# Patient Record
Sex: FEMALE | Race: BLACK OR AFRICAN AMERICAN
[De-identification: names, ages, dates, MRNs, and addresses within clinical notes are randomized per-mention and may not be internally consistent; named-entity substitution may affect disease eponyms.]

---

## 2017-01-24 ENCOUNTER — HOSPITAL ENCOUNTER (EMERGENCY)
Dept: HOSPITAL 72 - EMR | Age: 42
Discharge: HOME | End: 2017-01-24
Payer: COMMERCIAL

## 2017-01-24 VITALS — DIASTOLIC BLOOD PRESSURE: 5 MMHG | SYSTOLIC BLOOD PRESSURE: 154 MMHG

## 2017-01-24 VITALS — HEIGHT: 65 IN | WEIGHT: 165 LBS | BODY MASS INDEX: 27.49 KG/M2

## 2017-01-24 VITALS — SYSTOLIC BLOOD PRESSURE: 142 MMHG | DIASTOLIC BLOOD PRESSURE: 65 MMHG

## 2017-01-24 DIAGNOSIS — I10: ICD-10-CM

## 2017-01-24 DIAGNOSIS — E78.00: ICD-10-CM

## 2017-01-24 DIAGNOSIS — K52.9: Primary | ICD-10-CM

## 2017-01-24 LAB
ALBUMIN/GLOB SERPL: 1.2 {RATIO} (ref 1–2.7)
ALT SERPL-CCNC: 14 U/L (ref 3–33)
ANION GAP SERPL CALC-SCNC: 11 MMOL/L (ref 5–15)
APPEARANCE UR: CLEAR
AST SERPL-CCNC: 15 U/L (ref 5–40)
BACTERIA #/AREA URNS HPF: (no result) /HPF
BASOPHILS NFR BLD AUTO: 1.7 % (ref 0–2)
CALCIUM SERPL-MCNC: 8.6 MG/DL (ref 8.6–10.2)
CHLORIDE SERPL-SCNC: 99 MEQ/L (ref 98–107)
CO2 SERPL-SCNC: 27 MEQ/L (ref 20–30)
CREAT SERPL-MCNC: 0.9 MG/DL (ref 0.5–0.9)
EOSINOPHIL NFR BLD AUTO: 0.2 % (ref 0–3)
ERYTHROCYTE [DISTWIDTH] IN BLOOD BY AUTOMATED COUNT: 12.5 % (ref 11.6–14.8)
GFR SERPLBLD BASED ON 1.73 SQ M-ARVRAT: > 60 ML/MIN (ref 60–?)
GLOBULIN SER-MCNC: 3.2 G/DL
HEMOLYSIS: 8
KETONES UR QL STRIP: (no result)
LEUKOCYTE ESTERASE UR QL STRIP: (no result)
LIPASE SERPL-CCNC: 14 U/L (ref ?–60)
LYMPHOCYTES NFR BLD AUTO: 13.3 % (ref 20–45)
MCH RBC QN AUTO: 31.7 PG (ref 27–31)
MCHC RBC AUTO-ENTMCNC: 33.6 G/DL (ref 32–36)
MCV RBC AUTO: 95 FL (ref 80–99)
MONOCYTES NFR BLD AUTO: 8 % (ref 1–10)
NEUTROPHILS NFR BLD AUTO: 76.9 % (ref 45–75)
NITRITE UR QL STRIP: NEGATIVE
PH UR STRIP: 5 [PH] (ref 4.5–8)
PLATELET # BLD: 266 K/UL (ref 150–450)
PMV BLD AUTO: 7.5 FL (ref 6.5–10.1)
POTASSIUM SERPL-SCNC: 3.9 MEQ/L (ref 3.4–4.9)
PROT SERPL-MCNC: 7.2 G/DL (ref 6.6–8.7)
PROT UR QL STRIP: (no result)
RBC # BLD AUTO: 4.6 M/UL (ref 4.2–5.4)
RBC #/AREA URNS HPF: (no result) /HPF (ref 0–2)
SODIUM SERPL-SCNC: 137 MEQ/L (ref 135–145)
SP GR UR STRIP: 1.02 (ref 1–1.03)
SQUAMOUS #/AREA URNS LPF: (no result) /LPF
UROBILINOGEN UR-MCNC: 1 MG/DL (ref 0–1)
WBC # BLD AUTO: 6.2 K/UL (ref 4.8–10.8)
WBC #/AREA URNS HPF: (no result) /HPF (ref 0–2)

## 2017-01-24 PROCEDURE — 81025 URINE PREGNANCY TEST: CPT

## 2017-01-24 PROCEDURE — 85025 COMPLETE CBC W/AUTO DIFF WBC: CPT

## 2017-01-24 PROCEDURE — 99284 EMERGENCY DEPT VISIT MOD MDM: CPT

## 2017-01-24 PROCEDURE — 36415 COLL VENOUS BLD VENIPUNCTURE: CPT

## 2017-01-24 PROCEDURE — 96375 TX/PRO/DX INJ NEW DRUG ADDON: CPT

## 2017-01-24 PROCEDURE — 80053 COMPREHEN METABOLIC PANEL: CPT

## 2017-01-24 PROCEDURE — 83690 ASSAY OF LIPASE: CPT

## 2017-01-24 PROCEDURE — 96374 THER/PROPH/DIAG INJ IV PUSH: CPT

## 2017-01-24 PROCEDURE — 76700 US EXAM ABDOM COMPLETE: CPT

## 2017-01-24 PROCEDURE — 81003 URINALYSIS AUTO W/O SCOPE: CPT

## 2017-01-25 NOTE — DIAGNOSTIC IMAGING REPORT
Indication: Right upper quadrant and right flank pain



Technique: Gray-scale and duplex images of the upper abdomen were obtained



Comparison: None



Findings:    . Gallbladder is unremarkable, without stones, wall thickening, 

nor

pericholecystic fluid.  Sonographic Mathews's sign is negative. Common bile duct

measures 6 mm in diameter.  No intrahepatic biliary ductal dilatation.  

Liver

demonstrates normal echogenicity. Within segment 4a there is a 17 mm hyperechoic

mass with equivocal distal acoustic enhancement.  Portal vein and hepatic veins 

are

patent.  Pancreas is unremarkable.  Spleen is unremarkable. Left kidney measures

10.1 cm in length.  Right kidney measures 10.9 cm length.  Both kidneys 

demonstrate

normal echogenicity. There is no hydronephrosis.  No focal abnormality.

Non-aneurysmal abdominal aorta.



Impression: Negative for gallstones or dilated ducts



Probable left hepatic lobe hemangioma. Recommend further evaluation with MRI or CT

with hemangioma protocol

## 2017-01-25 NOTE — EMERGENCY ROOM REPORT
History of Present Illness


General


Chief Complaint:  Nausea, Vomiting, and Diarrhea


Source:  Patient





Present Illness


HPI


The pt is a 41 yo F with a hx of HTN presenting for abd pain, nausea, and 

vomiting which began after eating something questionable. The pt states the 

pain is a 9/10 dull ache to the mid upper and right upper region. Pain mostly 

presents with bouts of diarrhea/vomiting. The pt developed these symptoms 3 

days prior and frequency of N/V/D has been decreasing. The pt denies any other 

symptoms such as F, chills, HA, dysuria, hematuria, vaginal DC, SOB, CP


Allergies:  


Coded Allergies:  


     NO KNOWN ALLERGIES (Unverified  Allergy, Unknown, 9/7/15)





Patient History


Past Medical History:  see triage record


Pertinent Family History:  none


Last Menstrual Period:  1-17


Pregnant Now:  No


Reviewed Nursing Documentation:  PMH: Agreed, PSxH: Agreed





Nursing Documentation-PMH


Past Medical History:  No History, Except For


Hx Hypertension:  Yes - high cholesterol


Hx Neurological Problems:  Yes - migraine headache





Review of Systems


All Other Systems:  negative except mentioned in HPI





Physical Exam





Vital Signs








  Date Time  Temp Pulse Resp B/P Pulse Ox O2 Delivery O2 Flow Rate FiO2


 


1/24/17 14:29 98.1 77 18 154/104 98 Room Air  








Sp02 EP Interpretation:  reviewed, normal


General Appearance:  no apparent distress, alert, GCS 15, non-toxic


Head:  normocephalic, atraumatic


Eyes:  bilateral eye PERRL, bilateral eye normal inspection


ENT:  hearing grossly normal, normal pharynx, no angioedema, normal voice


Neck:  full range of motion, supple/symm/no masses


Respiratory:  chest non-tender, lungs clear, normal breath sounds, speaking 

full sentences


Cardiovascular #1:  regular rate, rhythm, no edema


Gastrointestinal:  soft, no mass, non-distended, no guarding, tenderness - 

epigastric and RUQ


Genitourinary:  normal inspection, no CVA tenderness


Musculoskeletal:  back normal, gait/station normal, normal range of motion, non-

tender, calf tenderness


Neurologic:  alert, oriented x3, responsive, motor strength/tone normal, 

sensory intact, speech normal


Psychiatric:  judgement/insight normal, memory normal, mood/affect normal, no 

suicidal/homicidal ideation


Skin:  normal color, no rash, warm/dry, well hydrated


Lymphatic:  no adenopathy





Medical Decision Making


PA Attestation


Dr. Barragan is my supervising physician. Patient management was discussed 

with my supervising physician


Diagnostic Impression:  


 Primary Impression:  


 Gastroenteritis


ER Course


The pt is a 41 yo F with a hx of HTN presenting for abd pain, nausea, and 

vomiting 





DDx: pregnancy, ectopic preg, cholelithiasis, cholecystitis, gastroenteritis, 

UTI 





PE: afebrile. NAD


Abd: soft, no guarding. Normal BS. + RUQ and epigastric TTP. 





ABd US:


Negative for gallstones or dilated ducts





Labs:


CBC and CMP unremarkable. No leukocytosis. 


UA not consistent with UTI.


Neg preg





The pt was advised that this US reading is preliminary and will call tomorrow 

for official reading. 





Pt DC'ed home with prescription for zofran and tylenol and will follow BRAT 

diet. ER precautions given





Labs








Test


  1/24/17


14:35 1/24/17


14:45


 


Urine Color Yellow  


 


Urine Appearance Clear  


 


Urine pH 5 (4.5-8.0)  


 


Urine Specific Gravity


  1.025


(1.005-1.035) 


 


 


Urine Protein 2+ (NEGATIVE)  


 


Urine Glucose (UA)


  Negative


(NEGATIVE) 


 


 


Urine Ketones 1+ (NEGATIVE)  


 


Urine Occult Blood 5+ (NEGATIVE)  


 


Urine Nitrite


  Negative


(NEGATIVE) 


 


 


Urine Bilirubin


  Negative


(NEGATIVE) 


 


 


Urine Urobilinogen


  1 MG/DL


(0.0-1.0) 


 


 


Urine Leukocyte Esterase 1+ (NEGATIVE)  


 


Urine RBC


  2-4 /HPF (0 -


2) 


 


 


Urine WBC


  0-2 /HPF (0 -


2) 


 


 


Urine Squamous Epithelial


Cells Few /LPF


(NONE/OCC) 


 


 


Urine Bacteria


  Few /HPF


(NONE) 


 


 


Urine HCG, Qualitative Negative  


 


White Blood Count


  


  6.2 K/UL


(4.8-10.8)


 


Red Blood Count


  


  4.60 M/UL


(4.20-5.40)


 


Hemoglobin


  


  14.6 G/DL


(12.0-16.0)


 


Hematocrit


  


  43.5 %


(37.0-47.0)


 


Mean Corpuscular Volume  95 FL (80-99) 


 


Mean Corpuscular Hemoglobin


  


  31.7 PG


(27.0-31.0)


 


Mean Corpuscular Hemoglobin


Concent 


  33.6 G/DL


(32.0-36.0)


 


Red Cell Distribution Width


  


  12.5 %


(11.6-14.8)


 


Platelet Count


  


  266 K/UL


(150-450)


 


Mean Platelet Volume


  


  7.5 FL


(6.5-10.1)


 


Neutrophils (%) (Auto)


  


  76.9 %


(45.0-75.0)


 


Lymphocytes (%) (Auto)


  


  13.3 %


(20.0-45.0)


 


Monocytes (%) (Auto)


  


  8.0 %


(1.0-10.0)


 


Eosinophils (%) (Auto)


  


  0.2 %


(0.0-3.0)


 


Basophils (%) (Auto)


  


  1.7 %


(0.0-2.0)


 


Sodium Level


  


  137 mEQ/L


(135-145)


 


Potassium Level


  


  3.9 mEQ/L


(3.4-4.9)


 


Chloride Level


  


  99 mEQ/L


()


 


Carbon Dioxide Level


  


  27 mEQ/L


(20-30)


 


Anion Gap  11 (5-15) 


 


Blood Urea Nitrogen


  


  11 mg/dL


(7-23)


 


Creatinine


  


  0.9 mg/dL


(0.5-0.9)


 


Estimat Glomerular Filtration


Rate 


  > 60 mL/min


(>60)


 


Glucose Level


  


  105 mg/dL


()


 


Calcium Level


  


  8.6 mg/dL


(8.6-10.2)


 


Total Bilirubin


  


  < 0.2 mg/dL


(0.0-1.2)


 


Aspartate Amino Transf


(AST/SGOT) 


  15 U/L (5-40) 


 


 


Alanine Aminotransferase


(ALT/SGPT) 


  14 U/L (3-33) 


 


 


Alkaline Phosphatase


  


  65 U/L


()


 


Total Protein


  


  7.2 g/dL


(6.6-8.7)


 


Albumin


  


  4.0 g/dL


(3.5-5.2)


 


Globulin  3.2 g/dL 


 


Albumin/Globulin Ratio  1.2 (1.0-2.7) 


 


Lipase  14 U/L (< 60) 








Lab Results Impression


CBC and CMP unremarkable. No leukocytosis. 


UA not consistent with UTI.


Neg preg





Last Vital Signs








  Date Time  Temp Pulse Resp B/P Pulse Ox O2 Delivery O2 Flow Rate FiO2


 


1/24/17 18:23 98.0 85 18 142/65 98 Room Air  








Status:  improved


Disposition:  HOME, SELF-CARE


Condition:  Improved


Scripts


Ondansetron* (ZOFRAN*) 4 Mg Tablet


4 MG ORAL Q6H Y for Nausea & Vomiting, #20 TAB


   Prov: EVANGELISTA MANRIQUE P.A.         1/24/17 


Acetaminophen* (TYLENOL EXTRA STRENGTH*) 500 Mg Tablet


500 MG ORAL Q8H Y for Prn Headache/Temp > 101, #30 TAB 0 Refills


   Prov: EVANGELISTA MANRIQUE         1/24/17


Departure Forms:  Return to Work      Return to Work Date:  Jan 25, 2017


         Return to Full Activity:  Jan 25, 2017


Patient Instructions:  Viral Gastroenteritis, Adult, Abdominal Pain, Adult





Additional Instructions:  


I discussed my findings with the patient. All questions and concerns have been 

answered. Treatment and medication compliance have been addressed. I advised 

the patient that they need to follow up with PMD in 3-5 days. Return to ED if 

symptoms worsen, new symptoms arise, or if needed for any reason. Patient 

verbalized understanding of discharge instructions.











EVANGELISTA MANRIQUE Jan 25, 2017 17:46

## 2017-06-14 ENCOUNTER — HOSPITAL ENCOUNTER (EMERGENCY)
Dept: HOSPITAL 72 - EMR | Age: 42
Discharge: HOME | End: 2017-06-14
Payer: COMMERCIAL

## 2017-06-14 VITALS — SYSTOLIC BLOOD PRESSURE: 140 MMHG | DIASTOLIC BLOOD PRESSURE: 96 MMHG

## 2017-06-14 VITALS — WEIGHT: 170 LBS | HEIGHT: 64 IN | BODY MASS INDEX: 29.02 KG/M2

## 2017-06-14 VITALS — DIASTOLIC BLOOD PRESSURE: 87 MMHG | SYSTOLIC BLOOD PRESSURE: 135 MMHG

## 2017-06-14 DIAGNOSIS — D25.9: ICD-10-CM

## 2017-06-14 DIAGNOSIS — Z3A.00: ICD-10-CM

## 2017-06-14 DIAGNOSIS — I10: ICD-10-CM

## 2017-06-14 DIAGNOSIS — O20.0: Primary | ICD-10-CM

## 2017-06-14 DIAGNOSIS — N83.202: ICD-10-CM

## 2017-06-14 LAB
ALBUMIN/GLOB SERPL: 1.2 {RATIO} (ref 1–2.7)
ALT SERPL-CCNC: 11 U/L (ref 3–33)
ANION GAP SERPL CALC-SCNC: 12 MMOL/L (ref 5–15)
APPEARANCE UR: CLEAR
AST SERPL-CCNC: 13 U/L (ref 5–40)
BACTERIA #/AREA URNS HPF: (no result) /HPF
BASOPHILS NFR BLD AUTO: 1.3 % (ref 0–2)
CALCIUM SERPL-MCNC: 8.7 MG/DL (ref 8.6–10.2)
CHLORIDE SERPL-SCNC: 101 MEQ/L (ref 98–107)
CO2 SERPL-SCNC: 24 MEQ/L (ref 20–30)
CREAT SERPL-MCNC: 0.9 MG/DL (ref 0.5–0.9)
EOSINOPHIL NFR BLD AUTO: 6.2 % (ref 0–3)
ERYTHROCYTE [DISTWIDTH] IN BLOOD BY AUTOMATED COUNT: 12.1 % (ref 11.6–14.8)
GFR SERPLBLD BASED ON 1.73 SQ M-ARVRAT: > 60 ML/MIN (ref 60–?)
GLOBULIN SER-MCNC: 3 G/DL
HEMOLYSIS: 0
KETONES UR QL STRIP: NEGATIVE
LEUKOCYTE ESTERASE UR QL STRIP: NEGATIVE
LIPASE SERPL-CCNC: 28 U/L (ref ?–60)
LYMPHOCYTES NFR BLD AUTO: 26.6 % (ref 20–45)
MCH RBC QN AUTO: 30.5 PG (ref 27–31)
MCHC RBC AUTO-ENTMCNC: 33.1 G/DL (ref 32–36)
MCV RBC AUTO: 92 FL (ref 80–99)
MONOCYTES NFR BLD AUTO: 7 % (ref 1–10)
NEUTROPHILS NFR BLD AUTO: 58.9 % (ref 45–75)
NITRITE UR QL STRIP: NEGATIVE
PH UR STRIP: 6 [PH] (ref 4.5–8)
PLATELET # BLD: 297 K/UL (ref 150–450)
PMV BLD AUTO: 7.1 FL (ref 6.5–10.1)
POTASSIUM SERPL-SCNC: 4.2 MEQ/L (ref 3.4–4.9)
PROT SERPL-MCNC: 6.6 G/DL (ref 6.6–8.7)
PROT UR QL STRIP: NEGATIVE
RBC # BLD AUTO: 4.49 M/UL (ref 4.2–5.4)
RBC #/AREA URNS HPF: (no result) /HPF (ref 0–2)
SODIUM SERPL-SCNC: 137 MEQ/L (ref 135–145)
SP GR UR STRIP: 1.02 (ref 1–1.03)
SQUAMOUS #/AREA URNS LPF: (no result) /LPF
TROPONIN I SERPL-MCNC: < 0.3 NG/ML (ref ?–0.3)
UROBILINOGEN UR-MCNC: NORMAL MG/DL (ref 0–1)
WBC # BLD AUTO: 6.1 K/UL (ref 4.8–10.8)
WBC #/AREA URNS HPF: (no result) /HPF (ref 0–2)

## 2017-06-14 PROCEDURE — 99284 EMERGENCY DEPT VISIT MOD MDM: CPT

## 2017-06-14 PROCEDURE — 81003 URINALYSIS AUTO W/O SCOPE: CPT

## 2017-06-14 PROCEDURE — 84484 ASSAY OF TROPONIN QUANT: CPT

## 2017-06-14 PROCEDURE — 84702 CHORIONIC GONADOTROPIN TEST: CPT

## 2017-06-14 PROCEDURE — 96374 THER/PROPH/DIAG INJ IV PUSH: CPT

## 2017-06-14 PROCEDURE — 96375 TX/PRO/DX INJ NEW DRUG ADDON: CPT

## 2017-06-14 PROCEDURE — 76801 OB US < 14 WKS SINGLE FETUS: CPT

## 2017-06-14 PROCEDURE — 83690 ASSAY OF LIPASE: CPT

## 2017-06-14 PROCEDURE — 36415 COLL VENOUS BLD VENIPUNCTURE: CPT

## 2017-06-14 PROCEDURE — 85025 COMPLETE CBC W/AUTO DIFF WBC: CPT

## 2017-06-14 PROCEDURE — 81025 URINE PREGNANCY TEST: CPT

## 2017-06-14 PROCEDURE — 76830 TRANSVAGINAL US NON-OB: CPT

## 2017-06-14 PROCEDURE — 80053 COMPREHEN METABOLIC PANEL: CPT

## 2017-06-14 NOTE — DIAGNOSTIC IMAGING REPORT
Indication: Pelvic pain, positive pregnancy test



Technique: Transabdominal and transvaginal images



Comparison: None



Findings: Uterus measures 13.7 cm length by 6 cm AP. Endometrium measures 13

millimeters thick. Small 8mm ill-defined fluid collection is seen within the

endometrium, but this does not demonstrate a definite decidual reaction; no 

definite

gestational sac is demonstrated. There are multiple uterine fibroids, which measure

up to 4.4 cm in diameter. The left ovary demonstrates a 5.6 cm cyst with 

internal

septations, thin wall and no significant peripheral hypervascularity, suggestive of

a hemorrhagic cyst. The right ovary measures 4.5 cm in length. There is free

cul-de-sac fluid.



Impression: No definite intrauterine pregnancy. A small fluid collection within 

the

endometrium is nonspecific. This could conceivably represent a small gestational

sac, but this does not correlate to the beta-hCG level, which is reportedly over

5000 per discussion with referring physician. Therefore, the possibility of 

ectopic

pregnancy should definitely be considered. Findings also represent spontaneous

. Correlation with serial beta hCGs is recommended



5.6 cm cyst in the left ovary with internal septations. Most likely a hemorrhagic

corpus luteum. Ectopic pregnancy as etiology of this finding less likely.



Trace free cul-de-sac fluid



Findings discussed by phone with Dr. Wiseman in the emergency room at the time 

of

interpretation

## 2017-06-14 NOTE — EMERGENCY ROOM REPORT
History of Present Illness


General


Chief Complaint:  Vaginal


Source:  Patient





Present Illness


HPI


42-year-old female presents ED complaining of lower abdominal pain x2 weeks.  

Notes pain in suprapubic area radiating through the epigastric region, 10 out 

of 10, sharp.  Denies nausea or vomiting.  Denies fevers or chills.  Denies 

discharge.  Denies dysuria or hematuria.  Denies chest pain shortness of 

breath.  No other aggravating relieving factors.  Denies any other associated 

symptoms


Allergies:  


Coded Allergies:  


     NO KNOWN ALLERGIES (Unverified  Allergy, Unknown, 9/7/15)





Patient History


Past Medical History:  migraines


Past Surgical History:  none


Pertinent Family History:  none


Social History:  Denies: alcohol use, drug use, smoking


Last Menstrual Period:  last month


Pregnant Now:  No


Immunizations:  UTD


Reviewed Nursing Documentation:  PMH: Agreed, PSxH: Agreed





Nursing Documentation-PMH


Past Medical History:  No History, Except For


Hx Hypertension:  Yes - high cholesterol


Hx Neurological Problems:  Yes - migraine headache





Review of Systems


All Other Systems:  negative except mentioned in HPI





Physical Exam





Vital Signs








  Date Time  Temp Pulse Resp B/P Pulse Ox O2 Delivery O2 Flow Rate FiO2


 


17 09:19 99.0 70 18 140/96 98 Room Air  








Sp02 EP Interpretation:  reviewed, normal


General Appearance:  no apparent distress, alert, GCS 15, non-toxic


Head:  normocephalic


Eyes:  bilateral eye PERRL, bilateral eye normal inspection


ENT:  normal ENT inspection


Neck:  normal inspection


Respiratory:  chest non-tender, lungs clear, normal breath sounds, speaking 

full sentences


Cardiovascular #1:  regular rate, rhythm, no edema


Gastrointestinal:  normal bowel sounds, soft, non-distended, no guarding, no 

rebound, tenderness


Rectal:  deferred


Genitourinary:  no CVA tenderness


Musculoskeletal:  normal inspection


Neurologic:  alert, oriented x3, responsive, motor strength/tone normal, 

sensory intact, speech normal


Psychiatric:  normal inspection


Skin:  normal inspection


Lymphatic:  normal inspection





Medical Decision Making


Diagnostic Impression:  


 Primary Impression:  


 Threatened 


ER Course


Hospital Course 


42-year-old F presents to ED complaining of abdominal pain





Differential diagnoses include: gastrits, gastroenterits, UTI





Clinical course


Patient placed on stretcher in ED.  After initial history and physical I 

ordered labs, IV fluids





Labs-no leukocytosis, electrolytes okay, BHCG +





patient was not aware she was pregnant.  BHCG quantitatve sent





BHCG 5100





Pelvic ultrasound- no IUP detected, fibroids, L hemorrhagic cyst noted. ? 

gestational sac





Discussed findings with the patient.  Unlikely ectopic pregnancy.  However I 

recommend close followup with OB/GYN





Diagnosis - threatend 





Stable and discharged to home.  Followup with PMD/OB/GYN.  Return to ED if 

symptoms recur or worsen





Labs








Test


  17


09:45


 


White Blood Count


  6.1 K/UL


(4.8-10.8)


 


Red Blood Count


  4.49 M/UL


(4.20-5.40)


 


Hemoglobin


  13.7 G/DL


(12.0-16.0)


 


Hematocrit


  41.5 %


(37.0-47.0)


 


Mean Corpuscular Volume 92 FL (80-99) 


 


Mean Corpuscular Hemoglobin


  30.5 PG


(27.0-31.0)


 


Mean Corpuscular Hemoglobin


Concent 33.1 G/DL


(32.0-36.0)


 


Red Cell Distribution Width


  12.1 %


(11.6-14.8)


 


Platelet Count


  297 K/UL


(150-450)


 


Mean Platelet Volume


  7.1 FL


(6.5-10.1)


 


Neutrophils (%) (Auto)


  58.9 %


(45.0-75.0)


 


Lymphocytes (%) (Auto)


  26.6 %


(20.0-45.0)


 


Monocytes (%) (Auto)


  7.0 %


(1.0-10.0)


 


Eosinophils (%) (Auto)


  6.2 %


(0.0-3.0)


 


Basophils (%) (Auto)


  1.3 %


(0.0-2.0)


 


Urine Color Pale yellow 


 


Urine Appearance Clear 


 


Urine pH 6 (4.5-8.0) 


 


Urine Specific Gravity


  1.020


(1.005-1.035)


 


Urine Protein


  Negative


(NEGATIVE)


 


Urine Glucose (UA)


  Negative


(NEGATIVE)


 


Urine Ketones


  Negative


(NEGATIVE)


 


Urine Occult Blood 2+ (NEGATIVE) 


 


Urine Nitrite


  Negative


(NEGATIVE)


 


Urine Bilirubin


  Negative


(NEGATIVE)


 


Urine Urobilinogen


  Normal MG/DL


(0.0-1.0)


 


Urine Leukocyte Esterase


  Negative


(NEGATIVE)


 


Urine RBC


  2-4 /HPF (0 -


2)


 


Urine WBC


  0-2 /HPF (0 -


2)


 


Urine Squamous Epithelial


Cells Moderate /LPF


(NONE/OCC)


 


Urine Bacteria


  Few /HPF


(NONE)


 


Urine HCG, Qualitative Positive 


 


Sodium Level


  137 mEQ/L


(135-145)


 


Potassium Level


  4.2 mEQ/L


(3.4-4.9)


 


Chloride Level


  101 mEQ/L


()


 


Carbon Dioxide Level


  24 mEQ/L


(20-30)


 


Anion Gap 12 (5-15) 


 


Blood Urea Nitrogen


  11 mg/dL


(7-23)


 


Creatinine


  0.9 mg/dL


(0.5-0.9)


 


Estimat Glomerular Filtration


Rate > 60 mL/min


(>60)


 


Glucose Level


  95 mg/dL


()


 


Calcium Level


  8.7 mg/dL


(8.6-10.2)


 


Total Bilirubin


  < 0.2 mg/dL


(0.0-1.2)


 


Aspartate Amino Transf


(AST/SGOT) 13 U/L (5-40) 


 


 


Alanine Aminotransferase


(ALT/SGPT) 11 U/L (3-33) 


 


 


Alkaline Phosphatase


  72 U/L


()


 


Troponin I


  < 0.30 ng/mL


(<=0.30)


 


Total Protein


  6.6 g/dL


(6.6-8.7)


 


Albumin


  3.6 g/dL


(3.5-5.2)


 


Globulin 3.0 g/dL 


 


Albumin/Globulin Ratio 1.2 (1.0-2.7) 


 


Lipase 28 U/L (< 60) 


 


Human Chorionic Gonadotropin,


Quant 5100 mIU/mL 


 








CT/MRI/US Diagnostic Results


CT/MRI/US Diagnostic Results :  


   Imaging Test Ordered:  OB US


   Impression


no definite IUP noted. fibroids noted. L hemorrhagic cyst noted





Last Vital Signs








  Date Time  Temp Pulse Resp B/P Pulse Ox O2 Delivery O2 Flow Rate FiO2


 


17 09:58 99.0 81 18 140/96 98 Room Air  








Status:  improved


Disposition:  HOME, SELF-CARE


Condition:  Stable


Scripts


Ondansetron Odt* (ZOFRAN ODT*) 4 Mg Tab.rapdis


4 MG ORAL Q6H Y for Nausea & Vomiting, #30 TAB 0 Refills


   Prov: WILLIAMS BOYKIN M.D.         17 


Acetaminophen* (TYLENOL EXTRA STRENGTH*) 500 Mg Tablet


500 MG ORAL Q8H Y for Prn Headache/Temp > 101, #30 TAB 0 Refills


   Prov: WILLIAMS BOYKIN M.D.         17


Referrals:  


PREFERRED [facility],REFERRING (PCP)











WILLIAMS BOYKIN M.D. 2017 10:32

## 2017-06-17 ENCOUNTER — HOSPITAL ENCOUNTER (EMERGENCY)
Dept: HOSPITAL 87 - ER | Age: 42
Discharge: HOME | End: 2017-06-17
Payer: MEDICAID

## 2017-06-17 VITALS — BODY MASS INDEX: 27.58 KG/M2 | WEIGHT: 155.65 LBS | HEIGHT: 63 IN

## 2017-06-17 VITALS — SYSTOLIC BLOOD PRESSURE: 136 MMHG | DIASTOLIC BLOOD PRESSURE: 91 MMHG

## 2017-06-17 DIAGNOSIS — R07.9: ICD-10-CM

## 2017-06-17 DIAGNOSIS — O26.891: Primary | ICD-10-CM

## 2017-06-17 DIAGNOSIS — Z3A.01: ICD-10-CM

## 2017-06-17 DIAGNOSIS — R11.0: ICD-10-CM

## 2017-06-17 DIAGNOSIS — R10.32: ICD-10-CM

## 2017-06-17 DIAGNOSIS — R10.31: ICD-10-CM

## 2017-06-17 LAB
APPEARANCE UR: CLEAR
B-HCG SERPL-ACNC: (no result) MIU/ML (ref ?–3)
BASOPHILS NFR BLD AUTO: 1.5 % (ref 0–2)
CHLORIDE SERPL-SCNC: 106 MEQ/L (ref 98–107)
CO2 SERPL-SCNC: 27 MEQ/L (ref 21–32)
COLOR UR: YELLOW
EOSINOPHIL NFR BLD AUTO: 5.2 % (ref 0–5)
ERYTHROCYTE [DISTWIDTH] IN BLOOD BY AUTOMATED COUNT: 14 % (ref 11.6–14.6)
GLUCOSE UR STRIP.AUTO-MCNC: NEGATIVE MG/DL
HCT VFR BLD AUTO: 39.9 % (ref 36–48)
HGB BLD-MCNC: 13.7 G/DL (ref 12–16)
HGB UR QL STRIP: NEGATIVE
INR PPP: 1.1
KETONES UR STRIP-MCNC: NEGATIVE MG/DL
LEUKOCYTE ESTERASE UR QL STRIP: NEGATIVE
LYMPHOCYTES NFR BLD AUTO: 31.5 % (ref 20–50)
MCH RBC QN AUTO: 31.1 PG (ref 28–32)
MCV RBC AUTO: 90.5 FL (ref 81–99)
MONOCYTES NFR BLD AUTO: 7.6 % (ref 2–8)
NEUTROPHILS NFR BLD AUTO: 54.2 % (ref 40–76)
NITRITE UR QL STRIP: NEGATIVE
PH UR STRIP: 7.5 [PH] (ref 4.5–8)
PLATELET # BLD AUTO: 293 X1000/UL (ref 130–400)
PMV BLD AUTO: 7.9 FL (ref 7.4–10.4)
PROT UR QL STRIP: NEGATIVE
PROTHROMBIN TIME: 11 SEC
RBC # BLD AUTO: 4.41 MILL/UL (ref 4.2–5.4)
SP GR UR STRIP: 1.02 (ref 1–1.03)
UROBILINOGEN UR STRIP-MCNC: 0.2 E.U./DL (ref 0.2–1)

## 2017-06-17 PROCEDURE — 76801 OB US < 14 WKS SINGLE FETUS: CPT

## 2017-06-17 PROCEDURE — 81003 URINALYSIS AUTO W/O SCOPE: CPT

## 2017-06-17 PROCEDURE — 84702 CHORIONIC GONADOTROPIN TEST: CPT

## 2017-06-17 PROCEDURE — 81025 URINE PREGNANCY TEST: CPT

## 2017-06-17 PROCEDURE — 99285 EMERGENCY DEPT VISIT HI MDM: CPT

## 2017-06-17 PROCEDURE — 85025 COMPLETE CBC W/AUTO DIFF WBC: CPT

## 2017-06-17 PROCEDURE — 83690 ASSAY OF LIPASE: CPT

## 2017-06-17 PROCEDURE — 36415 COLL VENOUS BLD VENIPUNCTURE: CPT

## 2017-06-17 PROCEDURE — 93005 ELECTROCARDIOGRAM TRACING: CPT

## 2017-06-17 PROCEDURE — 85610 PROTHROMBIN TIME: CPT

## 2017-06-17 PROCEDURE — 76817 TRANSVAGINAL US OBSTETRIC: CPT

## 2017-06-17 PROCEDURE — 80053 COMPREHEN METABOLIC PANEL: CPT

## 2017-06-22 ENCOUNTER — HOSPITAL ENCOUNTER (EMERGENCY)
Dept: HOSPITAL 87 - ER | Age: 42
Discharge: LEFT BEFORE DISCHARGE | End: 2017-06-22
Payer: MEDICAID

## 2017-06-22 VITALS — BODY MASS INDEX: 27.85 KG/M2 | HEIGHT: 64 IN | WEIGHT: 163.14 LBS

## 2017-06-22 VITALS — DIASTOLIC BLOOD PRESSURE: 75 MMHG | SYSTOLIC BLOOD PRESSURE: 131 MMHG

## 2017-06-22 DIAGNOSIS — M54.5: ICD-10-CM

## 2017-06-22 DIAGNOSIS — Z3A.01: ICD-10-CM

## 2017-06-22 DIAGNOSIS — O26.891: Primary | ICD-10-CM

## 2017-06-22 DIAGNOSIS — O21.9: ICD-10-CM

## 2017-06-22 DIAGNOSIS — R19.00: ICD-10-CM

## 2017-06-22 LAB
APPEARANCE UR: CLEAR
BASOPHILS NFR BLD AUTO: 1 % (ref 0–2)
CHLORIDE SERPL-SCNC: 104 MEQ/L (ref 98–107)
CO2 SERPL-SCNC: 29 MEQ/L (ref 21–32)
COLOR UR: YELLOW
EOSINOPHIL NFR BLD AUTO: 4.5 % (ref 0–5)
ERYTHROCYTE [DISTWIDTH] IN BLOOD BY AUTOMATED COUNT: 14.2 % (ref 11.6–14.6)
GLUCOSE UR STRIP.AUTO-MCNC: NEGATIVE MG/DL
HCG SERPL QL: POSITIVE
HCT VFR BLD AUTO: 41.9 % (ref 36–48)
HGB BLD-MCNC: 14.4 G/DL (ref 12–16)
HGB UR QL STRIP: NEGATIVE
INR PPP: 1.1
KETONES UR STRIP-MCNC: NEGATIVE MG/DL
LEUKOCYTE ESTERASE UR QL STRIP: NEGATIVE
LYMPHOCYTES NFR BLD AUTO: 24.8 % (ref 20–50)
MCH RBC QN AUTO: 31.1 PG (ref 28–32)
MCV RBC AUTO: 90.7 FL (ref 81–99)
MONOCYTES NFR BLD AUTO: 10.9 % (ref 2–8)
NEUTROPHILS NFR BLD AUTO: 58.8 % (ref 40–76)
NITRITE UR QL STRIP: NEGATIVE
PH UR STRIP: 7.5 [PH] (ref 4.5–8)
PLATELET # BLD AUTO: 297 X1000/UL (ref 130–400)
PMV BLD AUTO: 8.2 FL (ref 7.4–10.4)
PROT UR QL STRIP: NEGATIVE
PROTHROMBIN TIME: 11.3 SEC
RBC # BLD AUTO: 4.62 MILL/UL (ref 4.2–5.4)
SP GR UR STRIP: 1.02 (ref 1–1.03)
UROBILINOGEN UR STRIP-MCNC: 0.2 E.U./DL (ref 0.2–1)

## 2017-06-22 PROCEDURE — 81003 URINALYSIS AUTO W/O SCOPE: CPT

## 2017-06-22 PROCEDURE — 84703 CHORIONIC GONADOTROPIN ASSAY: CPT

## 2017-06-22 PROCEDURE — 36415 COLL VENOUS BLD VENIPUNCTURE: CPT

## 2017-06-22 PROCEDURE — 96360 HYDRATION IV INFUSION INIT: CPT

## 2017-06-22 PROCEDURE — 76857 US EXAM PELVIC LIMITED: CPT

## 2017-06-22 PROCEDURE — 83690 ASSAY OF LIPASE: CPT

## 2017-06-22 PROCEDURE — 76801 OB US < 14 WKS SINGLE FETUS: CPT

## 2017-06-22 PROCEDURE — 76817 TRANSVAGINAL US OBSTETRIC: CPT

## 2017-06-22 PROCEDURE — 99285 EMERGENCY DEPT VISIT HI MDM: CPT

## 2017-06-22 PROCEDURE — 76700 US EXAM ABDOM COMPLETE: CPT

## 2017-06-22 PROCEDURE — 85025 COMPLETE CBC W/AUTO DIFF WBC: CPT

## 2017-06-22 PROCEDURE — 80053 COMPREHEN METABOLIC PANEL: CPT

## 2017-06-22 PROCEDURE — 85610 PROTHROMBIN TIME: CPT

## 2017-07-25 ENCOUNTER — HOSPITAL ENCOUNTER (EMERGENCY)
Dept: HOSPITAL 72 - EMR | Age: 42
Discharge: HOME | End: 2017-07-25
Payer: COMMERCIAL

## 2017-07-25 VITALS — SYSTOLIC BLOOD PRESSURE: 140 MMHG | DIASTOLIC BLOOD PRESSURE: 87 MMHG

## 2017-07-25 VITALS — WEIGHT: 180 LBS | HEIGHT: 65 IN | BODY MASS INDEX: 29.99 KG/M2

## 2017-07-25 VITALS — DIASTOLIC BLOOD PRESSURE: 87 MMHG | SYSTOLIC BLOOD PRESSURE: 140 MMHG

## 2017-07-25 VITALS — SYSTOLIC BLOOD PRESSURE: 149 MMHG | DIASTOLIC BLOOD PRESSURE: 97 MMHG

## 2017-07-25 DIAGNOSIS — O34.11: ICD-10-CM

## 2017-07-25 DIAGNOSIS — Z3A.12: ICD-10-CM

## 2017-07-25 DIAGNOSIS — O26.891: Primary | ICD-10-CM

## 2017-07-25 DIAGNOSIS — D25.9: ICD-10-CM

## 2017-07-25 LAB
ALBUMIN/GLOB SERPL: 1 {RATIO} (ref 1–2.7)
ALT SERPL-CCNC: 13 U/L (ref 3–33)
ANION GAP SERPL CALC-SCNC: 12 MMOL/L (ref 5–15)
AST SERPL-CCNC: 13 U/L (ref 5–40)
BASOPHILS NFR BLD AUTO: 1.6 % (ref 0–2)
CALCIUM SERPL-MCNC: 9.4 MG/DL (ref 8.6–10.2)
CHLORIDE SERPL-SCNC: 100 MEQ/L (ref 98–107)
CO2 SERPL-SCNC: 23 MEQ/L (ref 20–30)
CREAT SERPL-MCNC: 0.7 MG/DL (ref 0.5–0.9)
EOSINOPHIL NFR BLD AUTO: 4.6 % (ref 0–3)
ERYTHROCYTE [DISTWIDTH] IN BLOOD BY AUTOMATED COUNT: 12.1 % (ref 11.6–14.8)
GFR SERPLBLD BASED ON 1.73 SQ M-ARVRAT: > 60 ML/MIN (ref 60–?)
GLOBULIN SER-MCNC: 3.3 G/DL
HEMOLYSIS: 19
LYMPHOCYTES NFR BLD AUTO: 27.5 % (ref 20–45)
MCH RBC QN AUTO: 34.4 PG (ref 27–31)
MCHC RBC AUTO-ENTMCNC: 36 G/DL (ref 32–36)
MCV RBC AUTO: 96 FL (ref 80–99)
MONOCYTES NFR BLD AUTO: 4.9 % (ref 1–10)
NEUTROPHILS NFR BLD AUTO: 61.3 % (ref 45–75)
PLATELET # BLD: 330 K/UL (ref 150–450)
PMV BLD AUTO: 7.7 FL (ref 6.5–10.1)
POTASSIUM SERPL-SCNC: 3.7 MEQ/L (ref 3.4–4.9)
PROT SERPL-MCNC: 6.7 G/DL (ref 6.6–8.7)
RBC # BLD AUTO: 4.42 M/UL (ref 4.2–5.4)
SODIUM SERPL-SCNC: 135 MEQ/L (ref 135–145)
WBC # BLD AUTO: 11 K/UL (ref 4.8–10.8)

## 2017-07-25 PROCEDURE — 99284 EMERGENCY DEPT VISIT MOD MDM: CPT

## 2017-07-25 PROCEDURE — 76801 OB US < 14 WKS SINGLE FETUS: CPT

## 2017-07-25 PROCEDURE — 80053 COMPREHEN METABOLIC PANEL: CPT

## 2017-07-25 PROCEDURE — 85025 COMPLETE CBC W/AUTO DIFF WBC: CPT

## 2017-07-25 PROCEDURE — 76830 TRANSVAGINAL US NON-OB: CPT

## 2017-07-25 PROCEDURE — 36415 COLL VENOUS BLD VENIPUNCTURE: CPT

## 2017-07-25 NOTE — EMERGENCY ROOM REPORT
History of Present Illness


General


Chief Complaint:  Multiple Trauma/Fall


Source:  Patient





Present Illness


HPI


This patient is 12 weeks and a few days pregnant.  She states that she was 

getting out of the just prior to arrival when she grabbed onto the shower 

curtain pole and it gave way and she fell onto her right side.  She states she 

has pain in her right abdomen and flank.  She denies vaginal bleeding.  She 

denies head injury.  She denies neck pain.  She has no other complaints.


Allergies:  


Coded Allergies:  


     NO KNOWN ALLERGIES (Unverified  Allergy, Unknown, 7/25/17)





Patient History


Past Medical History:  none


Past Surgical History:  none


Social History:  Denies: alcohol use, drug use, smoking


Last Menstrual Period:  unk


Pregnant Now:  Yes - 12 weeks


Reviewed Nursing Documentation:  PMH: Agreed, PSxH: Agreed





Nursing Documentation-PMH


Past Medical History:  No History, Except For


Hx Hypertension:  Yes - high cholesterol


Hx Neurological Problems:  Yes - migraine headache





Review of Systems


All Other Systems:  negative except mentioned in HPI





Physical Exam





Vital Signs








  Date Time  Temp Pulse Resp B/P Pulse Ox O2 Delivery O2 Flow Rate FiO2


 


7/25/17 19:16 98.8 92 16 149/97 100 Room Air  








Sp02 EP Interpretation:  reviewed, normal


General Appearance:  no apparent distress, alert, GCS 15, non-toxic


Head:  normocephalic, atraumatic


Eyes:  bilateral eye PERRL, bilateral eye normal inspection


ENT:  hearing grossly normal, normal pharynx, no angioedema, normal voice


Neck:  full range of motion, supple/symm/no masses


Respiratory:  chest non-tender, lungs clear, normal breath sounds, speaking 

full sentences


Cardiovascular #1:  regular rate, rhythm, no edema


Gastrointestinal:  normal bowel sounds, soft, non-distended, no guarding, no 

rebound, tenderness - TTP R. lower abdomen and r. flank.  No bony tenderness.


Rectal:  deferred


Musculoskeletal:  back normal, gait/station normal, normal range of motion, non-

tender


Neurologic:  alert, oriented x3, responsive, motor strength/tone normal, 

sensory intact, speech normal


Psychiatric:  judgement/insight normal, memory normal, mood/affect normal, no 

suicidal/homicidal ideation


Skin:  normal color, no rash, warm/dry, well hydrated





Medical Decision Making


Diagnostic Impression:  


 Primary Impression:  


 Fall


 Additional Impression:  


 First trimester pregnancy


ER Course


This patient presents with a fall in the first trimester.  OB ultrasound shows 

a normal live intrauterine pregnancy consistent with dates.  I did educate the 

patient at this point in her first trimester, although, the pregnancy is 

unlikely to have suffered injury, it is impossible to completely rule out 

abruption.  Given this is in the first trimester, no further workup is 

indicated at this time.  The patient was educated on return precautions to 

include vaginal bleeding or worsening pain.  Overall, the patient evaluation 

and history is benign.  The patient was given return precautions and followup 

instructions.





Labs








Test


  7/25/17


19:35


 


White Blood Count


  11.0 K/UL


(4.8-10.8)


 


Red Blood Count


  4.42 M/UL


(4.20-5.40)


 


Hemoglobin


  15.2 G/DL


(12.0-16.0)


 


Hematocrit


  42.3 %


(37.0-47.0)


 


Mean Corpuscular Volume 96 FL (80-99) 


 


Mean Corpuscular Hemoglobin


  34.4 PG


(27.0-31.0)


 


Mean Corpuscular Hemoglobin


Concent 36.0 G/DL


(32.0-36.0)


 


Red Cell Distribution Width


  12.1 %


(11.6-14.8)


 


Platelet Count


  330 K/UL


(150-450)


 


Mean Platelet Volume


  7.7 FL


(6.5-10.1)


 


Neutrophils (%) (Auto)


  61.3 %


(45.0-75.0)


 


Lymphocytes (%) (Auto)


  27.5 %


(20.0-45.0)


 


Monocytes (%) (Auto)


  4.9 %


(1.0-10.0)


 


Eosinophils (%) (Auto)


  4.6 %


(0.0-3.0)


 


Basophils (%) (Auto)


  1.6 %


(0.0-2.0)


 


Sodium Level


  135 mEQ/L


(135-145)


 


Potassium Level


  3.7 mEQ/L


(3.4-4.9)


 


Chloride Level


  100 mEQ/L


()


 


Carbon Dioxide Level


  23 mEQ/L


(20-30)


 


Anion Gap 12 (5-15) 


 


Blood Urea Nitrogen


  10 mg/dL


(7-23)


 


Creatinine


  0.7 mg/dL


(0.5-0.9)


 


Estimat Glomerular Filtration


Rate > 60 mL/min


(>60)


 


Glucose Level


  96 mg/dL


()


 


Calcium Level


  9.4 mg/dL


(8.6-10.2)


 


Total Bilirubin


  < 0.2 mg/dL


(0.0-1.2)


 


Aspartate Amino Transf


(AST/SGOT) 13 U/L (5-40) 


 


 


Alanine Aminotransferase


(ALT/SGPT) 13 U/L (3-33) 


 


 


Alkaline Phosphatase


  54 U/L


()


 


Total Protein


  6.7 g/dL


(6.6-8.7)


 


Albumin


  3.4 g/dL


(3.5-5.2)


 


Globulin 3.3 g/dL 


 


Albumin/Globulin Ratio 1.0 (1.0-2.7) 








CT/MRI/US Diagnostic Results


CT/MRI/US Diagnostic Results :  


   Imaging Test Ordered:  OB US


   Impression


Normal alive intrauterine pregnancy consistent with dates.  FHT are 168.





Last Vital Signs








  Date Time  Temp Pulse Resp B/P Pulse Ox O2 Delivery O2 Flow Rate FiO2


 


7/25/17 19:26 98.8 92 16 149/97 100 Room Air  








Disposition:  HOME, SELF-CARE


Condition:  Stable


Referrals:  


PREFERRED [facility],REFERRING (PCP)











DELFIN MELVIN D.O. Jul 25, 2017 20:25

## 2017-07-26 NOTE — DIAGNOSTIC IMAGING REPORT
Indication: TRAUMA, pelvic pain post trauma, patient fell against bathtub



Technique: Transabdominal and transvaginal images



Comparison: 6/14/2017



Findings: Exam is limited. Patient was unable tolerate transvaginal imaging, and 

the

uterus is not well-seen by transvaginal imaging the uterus being retroverted



There is now a definitive gestational sac within the endometrium. This demonstrates

a fetal pole with a crown-rump length of 44 mm, corresponding to an estimated

gestational age of 11 weeks 2 days. There is positive fetal heart activity, fetal

heart rate 160 beats for minute. No definite subchorionic hemorrhage. The yolk sac

is demonstrated. The uterus demonstrates multiple fibroids. These were 

demonstrated

previously. Largest of these measures 6.4 cm long axis dimension. The right ovary

could not be demonstrated. The left ovary measures 3.7 cm length. Previously

demonstrated presumed hemorrhagic corpus luteum is no longer evident



Impression: Somewhat limited exam, as described



Positive for 11 week 2 day single live intrauterine pregnancy. No definite 

unusual

features



Uterine fibroids



Previously reported left ovarian cyst, probably a hemorrhagic corpus luteum, is no

longer demonstrated

## 2017-08-03 ENCOUNTER — HOSPITAL ENCOUNTER (EMERGENCY)
Dept: HOSPITAL 87 - ER | Age: 42
Discharge: LEFT BEFORE DISCHARGE | End: 2017-08-03
Payer: MEDICAID

## 2017-08-03 VITALS — HEIGHT: 67 IN | BODY MASS INDEX: 27.68 KG/M2 | WEIGHT: 176.37 LBS

## 2017-08-03 VITALS — SYSTOLIC BLOOD PRESSURE: 138 MMHG | DIASTOLIC BLOOD PRESSURE: 90 MMHG

## 2017-08-03 DIAGNOSIS — R10.9: Primary | ICD-10-CM

## 2017-08-03 DIAGNOSIS — Z53.21: ICD-10-CM

## 2017-08-05 ENCOUNTER — HOSPITAL ENCOUNTER (EMERGENCY)
Dept: HOSPITAL 72 - EMR | Age: 42
Discharge: HOME | End: 2017-08-05
Payer: COMMERCIAL

## 2017-08-05 VITALS — WEIGHT: 181 LBS | BODY MASS INDEX: 30.16 KG/M2 | HEIGHT: 65 IN

## 2017-08-05 VITALS — SYSTOLIC BLOOD PRESSURE: 139 MMHG | DIASTOLIC BLOOD PRESSURE: 89 MMHG

## 2017-08-05 DIAGNOSIS — O16.1: ICD-10-CM

## 2017-08-05 DIAGNOSIS — O20.0: Primary | ICD-10-CM

## 2017-08-05 DIAGNOSIS — Z3A.13: ICD-10-CM

## 2017-08-05 DIAGNOSIS — O23.41: ICD-10-CM

## 2017-08-05 LAB
ALBUMIN/GLOB SERPL: 1 {RATIO} (ref 1–2.7)
ALT SERPL-CCNC: 16 U/L (ref 3–33)
ANION GAP SERPL CALC-SCNC: 12 MMOL/L (ref 5–15)
APPEARANCE UR: CLEAR
AST SERPL-CCNC: 18 U/L (ref 5–40)
BACTERIA #/AREA URNS HPF: (no result) /HPF
BASOPHILS NFR BLD AUTO: 1.1 % (ref 0–2)
CALCIUM SERPL-MCNC: 9.1 MG/DL (ref 8.6–10.2)
CHLORIDE SERPL-SCNC: 100 MEQ/L (ref 98–107)
CO2 SERPL-SCNC: 23 MEQ/L (ref 20–30)
CREAT SERPL-MCNC: 0.7 MG/DL (ref 0.5–0.9)
EOSINOPHIL NFR BLD AUTO: 4.9 % (ref 0–3)
ERYTHROCYTE [DISTWIDTH] IN BLOOD BY AUTOMATED COUNT: 12.1 % (ref 11.6–14.8)
GFR SERPLBLD BASED ON 1.73 SQ M-ARVRAT: > 60 ML/MIN (ref 60–?)
GLOBULIN SER-MCNC: 3.4 G/DL
HEMOLYSIS: 45
KETONES UR QL STRIP: NEGATIVE
LEUKOCYTE ESTERASE UR QL STRIP: NEGATIVE
LIPASE SERPL-CCNC: 24 U/L (ref ?–60)
LYMPHOCYTES NFR BLD AUTO: 29.1 % (ref 20–45)
MCH RBC QN AUTO: 34 PG (ref 27–31)
MCHC RBC AUTO-ENTMCNC: 36.2 G/DL (ref 32–36)
MCV RBC AUTO: 94 FL (ref 80–99)
MONOCYTES NFR BLD AUTO: 4.9 % (ref 1–10)
NEUTROPHILS NFR BLD AUTO: 60.1 % (ref 45–75)
NITRITE UR QL STRIP: NEGATIVE
PH UR STRIP: 6 [PH] (ref 4.5–8)
PLATELET # BLD: 293 K/UL (ref 150–450)
PMV BLD AUTO: 7.1 FL (ref 6.5–10.1)
POTASSIUM SERPL-SCNC: 3.8 MEQ/L (ref 3.4–4.9)
PROT SERPL-MCNC: 6.9 G/DL (ref 6.6–8.7)
PROT UR QL STRIP: NEGATIVE
RBC # BLD AUTO: 4.3 M/UL (ref 4.2–5.4)
RBC #/AREA URNS HPF: (no result) /HPF (ref 0–2)
SODIUM SERPL-SCNC: 135 MEQ/L (ref 135–145)
SP GR UR STRIP: 1.02 (ref 1–1.03)
SQUAMOUS #/AREA URNS LPF: (no result) /LPF
UROBILINOGEN UR-MCNC: 1 MG/DL (ref 0–1)
WBC # BLD AUTO: 11.9 K/UL (ref 4.8–10.8)
WBC #/AREA URNS HPF: (no result) /HPF (ref 0–2)

## 2017-08-05 PROCEDURE — 87086 URINE CULTURE/COLONY COUNT: CPT

## 2017-08-05 PROCEDURE — 83690 ASSAY OF LIPASE: CPT

## 2017-08-05 PROCEDURE — 76830 TRANSVAGINAL US NON-OB: CPT

## 2017-08-05 PROCEDURE — 85025 COMPLETE CBC W/AUTO DIFF WBC: CPT

## 2017-08-05 PROCEDURE — 99284 EMERGENCY DEPT VISIT MOD MDM: CPT

## 2017-08-05 PROCEDURE — 81003 URINALYSIS AUTO W/O SCOPE: CPT

## 2017-08-05 PROCEDURE — 84702 CHORIONIC GONADOTROPIN TEST: CPT

## 2017-08-05 PROCEDURE — 76801 OB US < 14 WKS SINGLE FETUS: CPT

## 2017-08-05 PROCEDURE — 81025 URINE PREGNANCY TEST: CPT

## 2017-08-05 PROCEDURE — 36415 COLL VENOUS BLD VENIPUNCTURE: CPT

## 2017-08-05 PROCEDURE — 80053 COMPREHEN METABOLIC PANEL: CPT

## 2017-08-06 NOTE — DIAGNOSTIC IMAGING REPORT
Indication: ABD PAIN



Technique: Transabdominal obstetrical ultrasound was performed.



Comparison: None.



Findings: There is a single live intrauterine pregnancy. Amniotic fluid volume is

within normal limits. The placenta is anterior. No placenta previa. Cardiac activity

is noted with a rate of 157 beats per minute.



Fetal anatomic evaluation is not adequate. Intracranial anatomy is grossly

unremarkable. Further anatomy not adequately evaluated.



There is a mass within the uterus measuring approximately 3.1 cm. Additional one is

noted in the posterior uterus.



Measurements are as follows: BPD 13 weeks 6 days.



Femur length 13 weeks 5 days.



Abdominal circumference 13 weeks 5 days.



Head circumference 14 weeks.



Impression: Single live intrauterine fetus with a gestational age of approximately

13 weeks 6 days. Inadequate evaluation of fetal anatomy the spine. Repeat near 20

weeks suggested.



Anterior placenta. No placenta previa.



Uterine fibroids.

## 2017-08-06 NOTE — EMERGENCY ROOM REPORT
History of Present Illness


General


Chief Complaint:  Pregnancy Complications


Source:  Patient





Present Illness


HPI


42-year-old female presents ED complaining of cramping abdominal pain times one 

day.  Patient states she is pregnant.  Believes she is about 12 weeks pregnant.

  Denies any dysuria or hematuria.  Pain is cramping, 7/10, nonradiating.  

Denies flank pain.  Denies nausea or vomiting.  Denies any bleeding.  No other 

aggravating relieving factors.  Denies any other associated symptoms


Allergies:  


Coded Allergies:  


     NO KNOWN ALLERGIES (Unverified  Allergy, Unknown, 17)





Patient History


Past Medical History:  migraines


Past Surgical History:  none


Pertinent Family History:  none


Social History:  Denies: alcohol use, drug use, smoking


Last Menstrual Period:  5/15


Pregnant Now:  Yes


:  3


Para:  1


Immunizations:  UTD


Reviewed Nursing Documentation:  PMH: Agreed, PSxH: Agreed





Nursing Documentation-PMH


Past Medical History:  No History, Except For


Hx Hypertension:  Yes - high cholesterol


Hx Neurological Problems:  Yes - migraine headache





Review of Systems


All Other Systems:  negative except mentioned in HPI





Physical Exam





Vital Signs








  Date Time  Temp Pulse Resp B/P Pulse Ox O2 Delivery O2 Flow Rate FiO2


 


17 18:22 97.9 92 16 155/96 99 Room Air  








Sp02 EP Interpretation:  reviewed, normal


General Appearance:  no apparent distress, alert, GCS 15, non-toxic


Head:  normocephalic


Eyes:  bilateral eye PERRL, bilateral eye normal inspection


ENT:  normal ENT inspection


Neck:  normal inspection


Respiratory:  chest non-tender, lungs clear, normal breath sounds, speaking 

full sentences


Cardiovascular #1:  regular rate, rhythm, no edema


Gastrointestinal:  normal bowel sounds, non tender, soft, non-distended, no 

guarding, no rebound


Rectal:  deferred


Genitourinary:  no CVA tenderness


Musculoskeletal:  normal inspection


Neurologic:  alert, oriented x3, responsive, motor strength/tone normal, 

sensory intact, speech normal


Psychiatric:  normal inspection


Skin:  normal inspection


Lymphatic:  normal inspection





Medical Decision Making


Diagnostic Impression:  


 Primary Impression:  


 Threatened 


 Additional Impression:  


 UTI (lower urinary tract infection)


ER Course


Hospital Course 


42-year-old F presents to ED complaining of abdominal pain, + pregnant





Differential diagnoses include: gastrits, gastroenterits, ectopic pregnancy, 

ovarian torsion/cyst, UTI 





Clinical course


Patient placed on stretcher in ED.  After initial history and physical I 

ordered labs, IV fluids and pelvic ultrasound.





Labs-no leukocytosis, electrolytes okay, beta hCG > 17,000, UA + bacteria





OB ultrasound- IUP noted approximately 13 weeks, + FHR





Clinically, findings consistent with threatened .  Discussed findings 

with patient.





Diagnosis - threatend , UTI





Stable and discharged to home with Rx tylenol, keflex.  Followup with PMD/OB/

GYN.  Return to ED if symptoms recur or worsen





Labs








Test


  17


18:45


 


White Blood Count


  11.9 K/UL


(4.8-10.8)


 


Red Blood Count


  4.30 M/UL


(4.20-5.40)


 


Hemoglobin


  14.6 G/DL


(12.0-16.0)


 


Hematocrit


  40.4 %


(37.0-47.0)


 


Mean Corpuscular Volume 94 FL (80-99) 


 


Mean Corpuscular Hemoglobin


  34.0 PG


(27.0-31.0)


 


Mean Corpuscular Hemoglobin


Concent 36.2 G/DL


(32.0-36.0)


 


Red Cell Distribution Width


  12.1 %


(11.6-14.8)


 


Platelet Count


  293 K/UL


(150-450)


 


Mean Platelet Volume


  7.1 FL


(6.5-10.1)


 


Neutrophils (%) (Auto)


  60.1 %


(45.0-75.0)


 


Lymphocytes (%) (Auto)


  29.1 %


(20.0-45.0)


 


Monocytes (%) (Auto)


  4.9 %


(1.0-10.0)


 


Eosinophils (%) (Auto)


  4.9 %


(0.0-3.0)


 


Basophils (%) (Auto)


  1.1 %


(0.0-2.0)


 


Urine Color Yellow 


 


Urine Appearance Clear 


 


Urine pH 6 (4.5-8.0) 


 


Urine Specific Gravity


  1.025


(1.005-1.035)


 


Urine Protein


  Negative


(NEGATIVE)


 


Urine Glucose (UA)


  Negative


(NEGATIVE)


 


Urine Ketones


  Negative


(NEGATIVE)


 


Urine Occult Blood 3+ (NEGATIVE) 


 


Urine Nitrite


  Negative


(NEGATIVE)


 


Urine Bilirubin


  Negative


(NEGATIVE)


 


Urine Urobilinogen


  1 MG/DL


(0.0-1.0)


 


Urine Leukocyte Esterase


  Negative


(NEGATIVE)


 


Urine RBC


  0-2 /HPF (0 -


2)


 


Urine WBC


  0-2 /HPF (0 -


2)


 


Urine Squamous Epithelial


Cells Many /LPF


(NONE/OCC)


 


Urine Bacteria


  Many /HPF


(NONE)


 


Urine HCG, Qualitative Positive 


 


Sodium Level


  135 mEQ/L


(135-145)


 


Potassium Level


  3.8 mEQ/L


(3.4-4.9)


 


Chloride Level


  100 mEQ/L


()


 


Carbon Dioxide Level


  23 mEQ/L


(20-30)


 


Anion Gap 12 (5-15) 


 


Blood Urea Nitrogen


  10 mg/dL


(7-23)


 


Creatinine


  0.7 mg/dL


(0.5-0.9)


 


Estimat Glomerular Filtration


Rate > 60 mL/min


(>60)


 


Glucose Level


  107 mg/dL


()


 


Calcium Level


  9.1 mg/dL


(8.6-10.2)


 


Total Bilirubin


  < 0.2 mg/dL


(0.0-1.2)


 


Aspartate Amino Transf


(AST/SGOT) 18 U/L (5-40) 


 


 


Alanine Aminotransferase


(ALT/SGPT) 16 U/L (3-33) 


 


 


Alkaline Phosphatase


  59 U/L


()


 


Total Protein


  6.9 g/dL


(6.6-8.7)


 


Albumin


  3.5 g/dL


(3.5-5.2)


 


Globulin 3.4 g/dL 


 


Albumin/Globulin Ratio 1.0 (1.0-2.7) 


 


Lipase 24 U/L (< 60) 


 


Human Chorionic Gonadotropin,


Quant 603190 mIU/mL 


 








CT/MRI/US Diagnostic Results


CT/MRI/US Diagnostic Results :  


   Imaging Test Ordered:  OB US


   Impression


IUP approximately 13 weeks + FHR





Last Vital Signs








  Date Time  Temp Pulse Resp B/P Pulse Ox O2 Delivery O2 Flow Rate FiO2


 


17 21:28  82 16 139/89 100 Room Air  


 


17 20:06 97.9       








Disposition:  HOME, SELF-CARE


Condition:  Stable


Scripts


Cephalexin* (KEFLEX*) 500 Mg Capsule


500 MG ORAL Q6H, #28 CAP 0 Refills


   Prov: WILLIAMS BOYKIN M.D.         17 


Acetaminophen* (TYLENOL EXTRA STRENGTH*) 500 Mg Tablet


500 MG ORAL Q8H Y for Prn Headache/Temp > 101, #30 TAB 0 Refills


   Prov: WILLIAMS BOYKIN M.D.         17


Departure Forms:  Return to Work      Return to Work Date:  Aug 7, 2017


   Work Restrictions:  No Heavy Lifting


Patient Instructions:  Threatened Miscarriage, Easy-to-Read, Pregnancy and 

Urinary Tract Infection











WILLIAMS BOYKIN M.D. Aug 6, 2017 16:12

## 2017-08-16 ENCOUNTER — HOSPITAL ENCOUNTER (EMERGENCY)
Dept: HOSPITAL 87 - ER | Age: 42
Discharge: HOME | End: 2017-08-16
Payer: MEDICAID

## 2017-08-16 VITALS — SYSTOLIC BLOOD PRESSURE: 118 MMHG | DIASTOLIC BLOOD PRESSURE: 69 MMHG

## 2017-08-16 VITALS — WEIGHT: 165.35 LBS | BODY MASS INDEX: 25.95 KG/M2 | HEIGHT: 67 IN

## 2017-08-16 DIAGNOSIS — O23.42: Primary | ICD-10-CM

## 2017-08-16 DIAGNOSIS — F12.10: ICD-10-CM

## 2017-08-16 DIAGNOSIS — O26.892: ICD-10-CM

## 2017-08-16 DIAGNOSIS — N39.0: ICD-10-CM

## 2017-08-16 DIAGNOSIS — Z3A.13: ICD-10-CM

## 2017-08-16 LAB
APPEARANCE UR: (no result)
B-HCG SERPL-ACNC: (no result) MIU/ML (ref ?–3)
BASOPHILS NFR BLD AUTO: 0.9 % (ref 0–2)
CHLORIDE SERPL-SCNC: 103 MEQ/L (ref 98–107)
CO2 SERPL-SCNC: 26 MEQ/L (ref 21–32)
COLOR UR: YELLOW
EOSINOPHIL NFR BLD AUTO: 5.3 % (ref 0–5)
ERYTHROCYTE [DISTWIDTH] IN BLOOD BY AUTOMATED COUNT: 13.7 % (ref 11.6–14.6)
GLUCOSE UR STRIP.AUTO-MCNC: NEGATIVE MG/DL
HCT VFR BLD AUTO: 41.1 % (ref 36–48)
HGB BLD-MCNC: 14.4 G/DL (ref 12–16)
HGB UR QL STRIP: NEGATIVE
KETONES UR STRIP-MCNC: NEGATIVE MG/DL
LEUKOCYTE ESTERASE UR QL STRIP: (no result)
LYMPHOCYTES NFR BLD AUTO: 23.3 % (ref 20–50)
MCH RBC QN AUTO: 32.2 PG (ref 28–32)
MCV RBC AUTO: 91.8 FL (ref 81–99)
MONOCYTES NFR BLD AUTO: 9.7 % (ref 2–8)
NEUTROPHILS NFR BLD AUTO: 60.8 % (ref 40–76)
NITRITE UR QL STRIP: NEGATIVE
PH UR STRIP: 7 [PH] (ref 4.5–8)
PLATELET # BLD AUTO: 310 X1000/UL (ref 130–400)
PMV BLD AUTO: 7.8 FL (ref 7.4–10.4)
PROT UR QL STRIP: NEGATIVE
RBC # BLD AUTO: 4.47 MILL/UL (ref 4.2–5.4)
SP GR UR STRIP: 1.02 (ref 1–1.03)
UROBILINOGEN UR STRIP-MCNC: 0.2 E.U./DL (ref 0.2–1)

## 2017-08-16 PROCEDURE — 99285 EMERGENCY DEPT VISIT HI MDM: CPT

## 2017-08-16 PROCEDURE — 36415 COLL VENOUS BLD VENIPUNCTURE: CPT

## 2017-08-16 PROCEDURE — 86900 BLOOD TYPING SEROLOGIC ABO: CPT

## 2017-08-16 PROCEDURE — 81025 URINE PREGNANCY TEST: CPT

## 2017-08-16 PROCEDURE — 96360 HYDRATION IV INFUSION INIT: CPT

## 2017-08-16 PROCEDURE — 81001 URINALYSIS AUTO W/SCOPE: CPT

## 2017-08-16 PROCEDURE — 84702 CHORIONIC GONADOTROPIN TEST: CPT

## 2017-08-16 PROCEDURE — 85025 COMPLETE CBC W/AUTO DIFF WBC: CPT

## 2017-08-16 PROCEDURE — 76801 OB US < 14 WKS SINGLE FETUS: CPT

## 2017-08-16 PROCEDURE — 80048 BASIC METABOLIC PNL TOTAL CA: CPT

## 2017-08-16 PROCEDURE — 86850 RBC ANTIBODY SCREEN: CPT

## 2017-08-23 ENCOUNTER — HOSPITAL ENCOUNTER (EMERGENCY)
Dept: HOSPITAL 87 - ER | Age: 42
Discharge: LEFT BEFORE DISCHARGE | End: 2017-08-23
Payer: MEDICAID

## 2017-08-23 DIAGNOSIS — Z53.21: ICD-10-CM

## 2017-08-23 DIAGNOSIS — R10.9: Primary | ICD-10-CM

## 2017-09-08 ENCOUNTER — HOSPITAL ENCOUNTER (EMERGENCY)
Dept: HOSPITAL 87 - ER | Age: 42
Discharge: HOME | End: 2017-09-08
Payer: MEDICAID

## 2017-09-08 VITALS — SYSTOLIC BLOOD PRESSURE: 121 MMHG | DIASTOLIC BLOOD PRESSURE: 90 MMHG

## 2017-09-08 VITALS — HEIGHT: 65 IN | BODY MASS INDEX: 31.96 KG/M2 | WEIGHT: 191.8 LBS

## 2017-09-08 DIAGNOSIS — O26.892: Primary | ICD-10-CM

## 2017-09-08 DIAGNOSIS — R07.89: ICD-10-CM

## 2017-09-08 DIAGNOSIS — G56.01: ICD-10-CM

## 2017-09-08 DIAGNOSIS — Z3A.17: ICD-10-CM

## 2017-09-08 LAB
APPEARANCE UR: (no result)
BASOPHILS NFR BLD AUTO: 0.4 % (ref 0–2)
CHLORIDE SERPL-SCNC: 106 MEQ/L (ref 98–107)
CO2 SERPL-SCNC: 23 MEQ/L (ref 21–32)
COLOR UR: YELLOW
EOSINOPHIL NFR BLD AUTO: 5.4 % (ref 0–5)
ERYTHROCYTE [DISTWIDTH] IN BLOOD BY AUTOMATED COUNT: 13.8 % (ref 11.6–14.6)
GLUCOSE UR STRIP.AUTO-MCNC: NEGATIVE MG/DL
HCT VFR BLD AUTO: 36.4 % (ref 36–48)
HGB BLD-MCNC: 12.7 G/DL (ref 12–16)
HGB UR QL STRIP: NEGATIVE
KETONES UR STRIP-MCNC: NEGATIVE MG/DL
LEUKOCYTE ESTERASE UR QL STRIP: (no result)
LYMPHOCYTES NFR BLD AUTO: 21.5 % (ref 20–50)
MCH RBC QN AUTO: 32.3 PG (ref 28–32)
MCV RBC AUTO: 92.2 FL (ref 81–99)
MONOCYTES NFR BLD AUTO: 11.2 % (ref 2–8)
NEUTROPHILS NFR BLD AUTO: 61.5 % (ref 40–76)
NITRITE UR QL STRIP: NEGATIVE
PH UR STRIP: 7.5 [PH] (ref 4.5–8)
PLATELET # BLD AUTO: 307 X1000/UL (ref 130–400)
PMV BLD AUTO: 8.3 FL (ref 7.4–10.4)
PROT UR QL STRIP: NEGATIVE
RBC # BLD AUTO: 3.94 MILL/UL (ref 4.2–5.4)
SP GR UR STRIP: 1.01 (ref 1–1.03)
UROBILINOGEN UR STRIP-MCNC: 0.2 E.U./DL (ref 0.2–1)

## 2017-09-08 PROCEDURE — 93005 ELECTROCARDIOGRAM TRACING: CPT

## 2017-09-08 PROCEDURE — 99285 EMERGENCY DEPT VISIT HI MDM: CPT

## 2017-09-08 PROCEDURE — 81001 URINALYSIS AUTO W/SCOPE: CPT

## 2017-09-08 PROCEDURE — 85025 COMPLETE CBC W/AUTO DIFF WBC: CPT

## 2017-09-08 PROCEDURE — 36415 COLL VENOUS BLD VENIPUNCTURE: CPT

## 2017-09-08 PROCEDURE — 81025 URINE PREGNANCY TEST: CPT

## 2017-09-08 PROCEDURE — 80048 BASIC METABOLIC PNL TOTAL CA: CPT

## 2018-09-09 ENCOUNTER — HOSPITAL ENCOUNTER (EMERGENCY)
Dept: HOSPITAL 72 - EMR | Age: 43
Discharge: HOME | End: 2018-09-09
Payer: COMMERCIAL

## 2018-09-09 VITALS — DIASTOLIC BLOOD PRESSURE: 86 MMHG | SYSTOLIC BLOOD PRESSURE: 126 MMHG

## 2018-09-09 VITALS — HEIGHT: 64 IN | BODY MASS INDEX: 29.19 KG/M2 | WEIGHT: 171 LBS

## 2018-09-09 VITALS — SYSTOLIC BLOOD PRESSURE: 126 MMHG | DIASTOLIC BLOOD PRESSURE: 86 MMHG
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
## 2018-09-09 DIAGNOSIS — S16.1XXA: Primary | ICD-10-CM

## 2018-09-09 PROCEDURE — 99283 EMERGENCY DEPT VISIT LOW MDM: CPT
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
## 2020-08-23 ENCOUNTER — HOSPITAL ENCOUNTER (EMERGENCY)
Dept: HOSPITAL 87 - ER | Age: 45
LOS: 1 days | Discharge: LEFT BEFORE DISCHARGE | End: 2020-08-24
Payer: MEDICAID

## 2020-08-23 VITALS — HEIGHT: 65 IN | BODY MASS INDEX: 19.83 KG/M2 | WEIGHT: 119.05 LBS

## 2020-08-23 DIAGNOSIS — I25.10: ICD-10-CM

## 2020-08-23 DIAGNOSIS — Z98.890: ICD-10-CM

## 2020-08-23 DIAGNOSIS — R07.89: Primary | ICD-10-CM

## 2020-08-23 DIAGNOSIS — I25.2: ICD-10-CM

## 2020-08-23 PROCEDURE — 83880 ASSAY OF NATRIURETIC PEPTIDE: CPT

## 2020-08-23 PROCEDURE — 96360 HYDRATION IV INFUSION INIT: CPT

## 2020-08-23 PROCEDURE — 80305 DRUG TEST PRSMV DIR OPT OBS: CPT

## 2020-08-23 PROCEDURE — 96361 HYDRATE IV INFUSION ADD-ON: CPT

## 2020-08-23 PROCEDURE — 93005 ELECTROCARDIOGRAM TRACING: CPT

## 2020-08-23 PROCEDURE — 84443 ASSAY THYROID STIM HORMONE: CPT

## 2020-08-23 PROCEDURE — 71045 X-RAY EXAM CHEST 1 VIEW: CPT

## 2020-08-23 PROCEDURE — 80053 COMPREHEN METABOLIC PANEL: CPT

## 2020-08-23 PROCEDURE — 82553 CREATINE MB FRACTION: CPT

## 2020-08-23 PROCEDURE — 80320 DRUG SCREEN QUANTALCOHOLS: CPT

## 2020-08-23 PROCEDURE — 82550 ASSAY OF CK (CPK): CPT

## 2020-08-23 PROCEDURE — 99285 EMERGENCY DEPT VISIT HI MDM: CPT

## 2020-08-23 PROCEDURE — G0480 DRUG TEST DEF 1-7 CLASSES: HCPCS

## 2020-08-23 PROCEDURE — 85025 COMPLETE CBC W/AUTO DIFF WBC: CPT

## 2020-08-23 PROCEDURE — 80061 LIPID PANEL: CPT

## 2020-08-23 PROCEDURE — 81003 URINALYSIS AUTO W/O SCOPE: CPT

## 2020-08-23 PROCEDURE — 36415 COLL VENOUS BLD VENIPUNCTURE: CPT

## 2020-08-23 PROCEDURE — 83605 ASSAY OF LACTIC ACID: CPT

## 2020-08-23 PROCEDURE — 84439 ASSAY OF FREE THYROXINE: CPT

## 2020-08-23 PROCEDURE — 84484 ASSAY OF TROPONIN QUANT: CPT

## 2020-08-24 VITALS — DIASTOLIC BLOOD PRESSURE: 74 MMHG | SYSTOLIC BLOOD PRESSURE: 124 MMHG

## 2020-08-24 LAB
APPEARANCE UR: (no result)
BASOPHILS NFR BLD AUTO: 0.3 % (ref 0–2)
CHLORIDE SERPL-SCNC: 112 MEQ/L (ref 98–107)
CK MB SERPL-CCNC: 6.9 NG/ML (ref 0.5–3.6)
CK SERPL-CCNC: 195 IU/L (ref 26–192)
COLOR UR: YELLOW
EOSINOPHIL NFR BLD AUTO: 1.3 % (ref 0–5)
ERYTHROCYTE [DISTWIDTH] IN BLOOD BY AUTOMATED COUNT: 15.9 % (ref 11.6–14.6)
ETHANOL SERPL-MCNC: < 10 MG/DL
HCT VFR BLD AUTO: 41 % (ref 36–48)
HDLC SERPL-MCNC: 27 MG/DL (ref 40–59)
HGB BLD-MCNC: 13.4 G/DL (ref 12–16)
HGB UR QL STRIP: (no result)
KETONES UR STRIP-MCNC: NEGATIVE MG/DL
LDLC SERPL DIRECT ASSAY-MCNC: 54 MG/DL (ref 5–100)
LEUKOCYTE ESTERASE UR QL STRIP: (no result)
LYMPHOCYTES NFR BLD AUTO: 8.6 % (ref 20–50)
MCH RBC QN AUTO: 29.4 PG (ref 28–32)
MCV RBC AUTO: 90 FL (ref 81–99)
MONOCYTES NFR BLD AUTO: 4.3 % (ref 2–8)
NEUTROPHILS NFR BLD AUTO: 85.5 % (ref 40–76)
NITRITE UR QL STRIP: NEGATIVE
PH UR STRIP: 6 [PH] (ref 4.5–8)
PLATELET # BLD AUTO: 291 X1000/UL (ref 130–400)
PMV BLD AUTO: 8.6 FL (ref 7.4–10.4)
PROT UR QL STRIP: (no result)
RBC # BLD AUTO: 4.55 MILL/UL (ref 4.2–5.4)
SP GR UR STRIP: 1.02 (ref 1–1.03)
T4 FREE SERPL-MCNC: 1.17 NG/DL (ref 0.76–1.46)
UROBILINOGEN UR STRIP-MCNC: 1 E.U./DL (ref 0.2–1)

## 2020-08-25 LAB
AMPHETAMINES UR QL SCN: NEGATIVE
BARBITURATES UR QL SCN: NEGATIVE
BENZODIAZ UR QL SCN: NEGATIVE
BZE UR QL SCN: (no result)
CANNABINOIDS UR QL SCN: (no result)
METHADONE UR QL SCN: NEGATIVE
OPIATES UR QL SCN: NEGATIVE
PCP UR QL SCN: NEGATIVE